# Patient Record
(demographics unavailable — no encounter records)

---

## 2025-05-09 NOTE — ASSESSMENT
[FreeTextEntry1] : # Eczema - chronic; exacerbation, on R pop fossa, # Impetiginized   and general # Xerosis - orientation provided about nature of condition, treatment expectations, alternatives, risks and benefits - Take short showers with warm, not hot, water. Moisturize immediately after bathing, repeating as needed throughout the day.  - Dry skin care instructions/information with OTC product recommendations (ex: Cerave, Vaseline)  Creams and ointments recommended over lotions. - Start mometasone ointment BID to affected areas for up to 2 weeks on/1 week OFF cycles. Repeat cycles as needed. SED.  - long term steroid use side effects such as skin atrophy, hypopigmentation and telangiectasis discussed - patient agrees to use topical steroids sparingly and as prescribed - mix mometasone with mupirocin oint and apply BID until clear for 2-3 weeks to the POP fossa given yellow crusting today.   # PIH, abdomen - possibly 2/2 molluscum (?) - original bump resolved with adapalne before - now just discoloration, counseled it will fade over time.   RTC PRN

## 2025-05-09 NOTE — PHYSICAL EXAM
[FreeTextEntry3] : General: well appearing person in nad, alert, pleasant Focused Skin Exam per patient preference: eczematous plaque with superficial yellow crusting on R pop fossa L abdomen small light brown macule diffuse xerosis

## 2025-05-09 NOTE — HISTORY OF PRESENT ILLNESS
[FreeTextEntry1] : NP -eczema  [de-identified] : Ms. CLOTILDE PORTILLO  is a 4 year  y/o F  here with her mom for evaluation of below   # hx of eczema as a baby. has been doing well using just Aquaphor PRN over yrs. recently flaring behind the R knee.  # L abdomen bump for the last couple months. prev advised by outside derm to apply adapalene. after a few days she noted it came out " like a pimple" and since then has healed with dark spot. asx.